# Patient Record
Sex: MALE | Race: BLACK OR AFRICAN AMERICAN | Employment: UNEMPLOYED | URBAN - METROPOLITAN AREA
[De-identification: names, ages, dates, MRNs, and addresses within clinical notes are randomized per-mention and may not be internally consistent; named-entity substitution may affect disease eponyms.]

---

## 2020-01-22 ENCOUNTER — HOSPITAL ENCOUNTER (EMERGENCY)
Age: 50
Discharge: HOME | End: 2020-01-22
Attending: EMERGENCY MEDICINE
Payer: MEDICARE

## 2020-01-22 VITALS
HEART RATE: 69 BPM | BODY MASS INDEX: 28.85 KG/M2 | DIASTOLIC BLOOD PRESSURE: 70 MMHG | WEIGHT: 213 LBS | SYSTOLIC BLOOD PRESSURE: 125 MMHG | HEIGHT: 72 IN | RESPIRATION RATE: 18 BRPM | TEMPERATURE: 98.5 F | OXYGEN SATURATION: 98 %

## 2020-01-22 PROCEDURE — 99282 EMERGENCY DEPT VISIT SF MDM: CPT

## 2020-01-22 PROCEDURE — 69210 REMOVE IMPACTED EAR WAX UNI: CPT

## 2020-01-22 PROCEDURE — 6370000000 HC RX 637 (ALT 250 FOR IP): Performed by: STUDENT IN AN ORGANIZED HEALTH CARE EDUCATION/TRAINING PROGRAM

## 2020-01-22 RX ORDER — MAGNESIUM CARB/ALUMINUM HYDROX 105-160MG
TABLET,CHEWABLE ORAL ONCE
Status: COMPLETED | OUTPATIENT
Start: 2020-01-22 | End: 2020-01-22

## 2020-01-22 RX ADMIN — MINERAL OIL: 1000 SOLUTION ORAL at 16:04

## 2020-01-22 ASSESSMENT — ENCOUNTER SYMPTOMS
NAUSEA: 0
VOMITING: 0
EYE ITCHING: 0
COUGH: 0
SORE THROAT: 0
EYE REDNESS: 0

## 2020-01-22 ASSESSMENT — PAIN DESCRIPTION - PAIN TYPE: TYPE: ACUTE PAIN

## 2020-01-22 ASSESSMENT — PAIN DESCRIPTION - LOCATION: LOCATION: EAR

## 2020-01-22 ASSESSMENT — PAIN DESCRIPTION - ORIENTATION: ORIENTATION: LEFT

## 2020-01-22 ASSESSMENT — PAIN SCALES - GENERAL: PAINLEVEL_OUTOF10: 8

## 2020-01-22 NOTE — ED PROVIDER NOTES
[address] [facility]     Emergency Department     Faculty Attestation    I performed a history and physical examination of the patient and discussed management with the resident. I reviewed the residents note and agree with the documented findings including all diagnostic interpretations and plan of care. Any areas of disagreement are noted on the chart. I was personally present for the key portions of any procedures. I have documented in the chart those procedures where I was not present during the key portions. I have reviewed the emergency nurses triage note. I agree with the chief complaint, past medical history, past surgical history, allergies, medications, social and family history as documented unless otherwise noted below. Documentation of the HPI, Physical Exam and Medical Decision Making performed by [email or web address] is based on my personal performance of the HPI, PE and MDM. For Physician Assistant/ Nurse Practitioner cases/documentation I have personally evaluated this patient and have completed at least one if not all key elements of the E/M (history, physical exam, and MDM). Additional findings are as noted. Primary Care Physician: No primary care provider on file. History: This is a 54 y.o. male who presents to the Emergency Department with complaint of otalgia. Left ear. No drainage no fevers does have decreased hearing. Physical:     height is 6' (1.829 m) and weight is 213 lb (96.6 kg). His oral temperature is 98.5 °F (36.9 °C). His blood pressure is 125/70 and his pulse is 69. His respiration is 18 and oxygen saturation is 98%.     54 y.o. male no acute distress, left ear shows cerumen impaction, no evidence of edema or erythema in the external canal    Impression: Impacted cerumen    Plan: Mineral oil, irrigation       Sherita Sanz MD, [facility]  Attending Emergency Physician         Temo Jean Baptiste MD  01/22/20 4067

## 2020-01-22 NOTE — ED PROVIDER NOTES
[facility] ED  Emergency Department Encounter  Emergency Medicine Resident     Pt Name: Deacon Kebede  MRN: [id number]  [place] 1970  Date ofevaluation: 1/22/20  PCP:  No primary care provider on file. CHIEF COMPLAINT       Chief Complaint   Patient presents with    Otalgia     pt c/o left ear and pressure x 1 month     HISTORY OF PRESENT ILLNESS  (Location/Symptom, Timing/Onset, Context/Setting, Quality, Duration, Modifying Factors, Severity, Associated signs/symptoms)     Deacon Kebede is a 54 y.o. male who presents left-sided hearing loss and pressure for the last month. Patient states that he has had this similar issue before when his ear gets clogged with cerumen. He has been trying to use Maco pins without any success. Also reports some mild ear pain with occasionally at night. No ear discharge, fevers, chills sore throat, rhinorrhea,  unusual headaches, vision changes, weakness numbness or tingling or other concerns. PAST MEDICAL / SURGICAL / SOCIAL / FAMILY HISTORY      has no past medical history on file. has no past surgical history on file.     Social History     Socioeconomic History    Marital status: Single     Spouse name: Not on file    Number of children: Not on file    Years of education: Not on file    Highest education level: Not on file   Occupational History    Not on file   Social Needs    Financial resource strain: Not on file    Food insecurity:     Worry: Not on file     Inability: Not on file    Transportation needs:     Medical: Not on file     Non-medical: Not on file   Tobacco Use    Smoking status: Current Every Day Smoker     Packs/day: 0.50    Smokeless tobacco: Never Used   Substance and Sexual Activity    Alcohol use: Not Currently    Drug use: Yes     Types: Marijuana     Comment: daily     Sexual activity: Not on file   Lifestyle    Physical activity:     Days per week: Not on file     Minutes per session: Not on file    Stress: Not on file   Relationships    Social connections:     Talks on phone: Not on file     Gets together: Not on file     Attends Taoism service: Not on file     Active member of club or organization: Not on file     Attends meetings of clubs or organizations: Not on file     Relationship status: Not on file    Intimate partner violence:     Fear of current or ex partner: Not on file     Emotionally abused: Not on file     Physically abused: Not on file     Forced sexual activity: Not on file   Other Topics Concern    Not on file   Social History Narrative    Not on file       History reviewed. No pertinent family history. Allergies:  Patient has no known allergies. Home Medications:  Prior to Admission medications    Medication Sig Start Date End Date Taking? Authorizing Provider   carbamide peroxide (DEBROX) 6.5 % otic solution Place 5 drops into both ears as needed (for earwax removal) 1/22/20 2/21/20 Yes Alden Alt, DO       REVIEW OF SYSTEMS    (2-9 systems for level 4, 10 or more for level 5)      Review of Systems   Constitutional: Negative for chills and fever. HENT: Positive for ear pain and hearing loss (L side). Negative for ear discharge and sore throat. Eyes: Negative for redness and itching. Respiratory: Negative for cough. Gastrointestinal: Negative for nausea and vomiting. Skin: Negative for rash and wound. Allergic/Immunologic: Negative for environmental allergies and food allergies. Neurological: Negative for dizziness, weakness, light-headedness, numbness and headaches. PHYSICAL EXAM   (up to 7 for level 4, 8 or more for level 5)      INITIAL VITALS:   /70   Pulse 69   Temp 98.5 °F (36.9 °C) (Oral)   Resp 18   Ht 6' (1.829 m)   Wt 213 lb (96.6 kg)   SpO2 98%   BMI 28.89 kg/m²     Physical Exam  Vitals signs and nursing note reviewed. Constitutional:       General: He is not in acute distress. Appearance: Normal appearance.  He is not ill-appearing, toxic-appearing or diaphoretic. HENT:      Head: Normocephalic and atraumatic. Comments: No mastoid tenderness to palpation. Right Ear: Tympanic membrane and ear canal normal.      Left Ear: There is impacted cerumen. Mouth/Throat:      Mouth: Mucous membranes are moist.      Pharynx: Oropharynx is clear. No oropharyngeal exudate or posterior oropharyngeal erythema. Eyes:      Comments: False L eye present. Neck:      Musculoskeletal: Neck supple. Cardiovascular:      Rate and Rhythm: Normal rate and regular rhythm. Heart sounds: No friction rub. No gallop. Pulmonary:      Effort: Pulmonary effort is normal. No respiratory distress. Breath sounds: Normal breath sounds. No stridor. No wheezing, rhonchi or rales. Abdominal:      Palpations: Abdomen is soft. Tenderness: There is no tenderness. Lymphadenopathy:      Cervical: No cervical adenopathy. Skin:     General: Skin is warm and dry. Findings: No rash (over exposed skin). Neurological:      General: No focal deficit present. Mental Status: He is alert and oriented to person, place, and time. Psychiatric:         Mood and Affect: Mood normal.         Behavior: Behavior normal.       DIAGNOSTICS     PLAN (LABS / IMAGING / EKG):  No orders of the defined types were placed in this encounter. MEDICATIONS ORDERED:  Orders Placed This Encounter   Medications    mineral oil liquid    carbamide peroxide (DEBROX) 6.5 % otic solution     Sig: Place 5 drops into both ears as needed (for earwax removal)     Dispense:  1 Bottle     Refill:  0     DIAGNOSTIC RESULTS / EMERGENCYDEPARTMENT COURSE / MDM     LABS:  No results found for this visit on 01/22/20. EMERGENCY DEPARTMENT COURSE:    Patient evaluated by attending physician and myself. Patient presenting with left ear hearing loss. Patient reports he has before and usually has ears cleaned out.   On exam he is nontoxic-appearing no acute distress. His vitals are unremarkable. Heart regular rate and rhythm, lungs are clear to auscultation bilaterally. He does have significant impacted cerumen in the left ear, with only some mild cerumen in the right ear. Tympanic membrane right ear appears clear, cannot visualize TM on left due to significant cerumen impaction. Patient's ear was irrigated several times with mineral oil as well as warm water. Did get significant return, however he continued to have cerumen. Discussed with patient that he will likely need to continue his treatments at home and he is in agreement with this plan. Prescribed Celebrex, and discussed appropriate ear care. Strict return precautions given. Patient instructed to follow with his primary care provider as soon as possible for follow up. Patient and/or family expressed understanding and agreement with this plan. PROCEDURES:  None    CONSULTS:  None    FINAL IMPRESSION      1.  Impacted cerumen of left ear          DISPOSITION / PLAN     DISPOSITION Decision To Discharge 01/22/2020 05:10:25 PM      PATIENT REFERRED TO:  [facility]  [address] [facility] [id number] [phone]  Go to   If symptoms worsen      DISCHARGE MEDICATIONS:  Discharge Medication List as of 1/22/2020  4:44 PM      START taking these medications    Details   carbamide peroxide (DEBROX) 6.5 % otic solution Place 5 drops into both ears as needed (for earwax removal), Disp-1 Bottle, R-0Print             Norman Ellis DO  Emergency Medicine Resident  [facility]    (Please note that portions of this note were completed with a voice recognition program.  Efforts were made to edit thedictations but occasionally words are mis-transcribed.)      Norman Ellis DO  Resident  01/22/20 5625

## 2020-01-22 NOTE — ED NOTES
Pt presents to the ED c/o bilateral ear pain. Pt reports gradually worsening ear pain x 1 month, states that pain is worse to the left ear. Pt states that he has been having gradually worsening pressure in the left ear and decreased hearing. Pt reports similar symptoms in the past when he has had to have his ear cleaned out. Pt denies any other symptoms at this time.   On exam, mild cerumen noted in the right ear canal, left ear canal appears impacted with cerumen, pt afebrile, pt awake and oriented x 4, pt with stable vital signs      Casey Galindo RN  01/22/20 0044

## 2020-09-08 ENCOUNTER — HOSPITAL ENCOUNTER (EMERGENCY)
Age: 50
Discharge: HOME | End: 2020-09-08
Attending: EMERGENCY MEDICINE
Payer: MEDICARE

## 2020-09-08 VITALS
OXYGEN SATURATION: 96 % | WEIGHT: 230 LBS | HEIGHT: 72 IN | DIASTOLIC BLOOD PRESSURE: 72 MMHG | HEART RATE: 99 BPM | TEMPERATURE: 98.7 F | BODY MASS INDEX: 31.15 KG/M2 | RESPIRATION RATE: 16 BRPM | SYSTOLIC BLOOD PRESSURE: 116 MMHG

## 2020-09-08 PROCEDURE — 99283 EMERGENCY DEPT VISIT LOW MDM: CPT

## 2020-09-08 PROCEDURE — 93970 EXTREMITY STUDY: CPT

## 2020-09-08 PROCEDURE — 6370000000 HC RX 637 (ALT 250 FOR IP): Performed by: EMERGENCY MEDICINE

## 2020-09-08 RX ORDER — IBUPROFEN 800 MG/1
800 TABLET ORAL EVERY 8 HOURS PRN
Qty: 30 TABLET | Refills: 0 | Status: SHIPPED | OUTPATIENT
Start: 2020-09-08

## 2020-09-08 RX ORDER — IBUPROFEN 800 MG/1
800 TABLET ORAL ONCE
Status: COMPLETED | OUTPATIENT
Start: 2020-09-08 | End: 2020-09-08

## 2020-09-08 RX ADMIN — IBUPROFEN 800 MG: 800 TABLET ORAL at 19:12

## 2020-09-08 ASSESSMENT — ENCOUNTER SYMPTOMS
SORE THROAT: 0
EYE PAIN: 0
ABDOMINAL PAIN: 0
DIARRHEA: 0
NAUSEA: 0
VOMITING: 0
COUGH: 0
SHORTNESS OF BREATH: 0

## 2020-09-08 ASSESSMENT — PAIN SCALES - GENERAL: PAINLEVEL_OUTOF10: 8

## 2020-09-08 ASSESSMENT — PAIN DESCRIPTION - FREQUENCY: FREQUENCY: CONTINUOUS

## 2020-09-08 ASSESSMENT — PAIN DESCRIPTION - ORIENTATION: ORIENTATION: RIGHT

## 2020-09-08 ASSESSMENT — PAIN DESCRIPTION - LOCATION: LOCATION: LEG

## 2020-09-08 NOTE — ED NOTES
Bed: 45  Expected date:   Expected time:   Means of arrival:   Comments:  No Monitor     Kim Cifuentes RN  09/08/20 9433

## 2020-09-08 NOTE — ED NOTES
Pt arrived to ED with c/o right lower leg pain since Friday;  Pt reports pain increases when stretching or bending leg;  Pt denies swelling; denies; numbness/tingling;  Pt A&OX4; RR even/unlabored; NAD noted;   Pt denies hx of clots; call light within reach; will cont to monitor     Raleigh Mackenzie RN  09/08/20 8478

## 2020-09-08 NOTE — ED PROVIDER NOTES
[facility]  Emergency Department  Faculty Attestation     I performed a history and physical examination of the patient and discussed management with the resident. I reviewed the residents note and agree with the documented findings and plan of care. Any areas of disagreement are noted on the chart. I was personally present for the key portions of any procedures. I have documented in the chart those procedures where I was not present during the key portions. I have reviewed the emergency nurses triage note. I agree with the chief complaint, past medical history, past surgical history, allergies, medications, social and family history as documented unless otherwise noted below. For Physician Assistant/ Nurse Practitioner cases/documentation I have personally evaluated this patient and have completed at least one if not all key elements of the E/M (history, physical exam, and MDM). Additional findings are as noted. Primary Care Physician:  No primary care provider on file. Screenings:  [unfilled]    CHIEF COMPLAINT       Chief Complaint   Patient presents with    Leg Pain     RLE       RECENT VITALS:   Temp: 98.7 °F (37.1 °C),  Pulse: 99, Resp: 16, BP: 116/72    LABS:  Labs Reviewed - No data to display    Radiology  VL DUP LOWER EXTREMITY VENOUS BILATERAL    (Results Pending)       Attending Physician Additional  Notes    Patient has pain and swelling in the right calf. No trauma. No chest pain shortness of breath or hemoptysis. No history of VTE. No family history of VTE. On exam he appears comfortable afebrile vital signs are normal.  Lungs are clear. No gallop. No JVD. There is right calf tenderness with some swelling. Normal pulses. Normal sensation. Normal capillary refill. Impression is calf swelling rule out DVT, no evidence of compartment syndrome. Plan is Doppler, reassess. Everette Sheppard.  Pat Wagoner MD, [address] [facility]  Attending Emergency Physician               Lucía Snowden MD  09/08/20 8363

## 2020-09-09 NOTE — ED PROVIDER NOTES
[facility]  ED  Emergency Department Encounter  EmergencyMedicine Resident     Pt Virginia Fitzpatrick  MRN: [id number]  [place] 1970  Date of evaluation: 9/8/20  PCP:  No primary care provider on file. CHIEF COMPLAINT       Chief Complaint   Patient presents with    Leg Pain     RLE       HISTORY OF PRESENT ILLNESS  (Location/Symptom, Timing/Onset, Context/Setting, Quality, Duration, Modifying Factors, Severity.)      Adam Gay is a 52 y.o. male who presents with complaints of swelling and tenderness in his right lower leg. Patient reports that he has had the symptoms for the past couple days. Mom reports that he looks like his veins are popping out. He has never had really had this in the past.  He has never had a history of DVT or PE. He is not on any blood thinners. He reports no chest pain, shortness of breath, cough. No fevers or chills. No nausea vomiting. Normal ambulation. No known cardiac issues. He is on antihypertensives. Reports no tingling, weakness, numbness. He does have a scrape across the right anterior tibia that he sustained about just over a week ago when he bumped it on a drawer. No drainage from the wound no significant redness or swelling around the wound site. PAST MEDICAL / SURGICAL / SOCIAL / FAMILY HISTORY      has no past medical history on file. has no past surgical history on file.     Social History     Socioeconomic History    Marital status: Single     Spouse name: Not on file    Number of children: Not on file    Years of education: Not on file    Highest education level: Not on file   Occupational History    Not on file   Social Needs    Financial resource strain: Not on file    Food insecurity     Worry: Not on file     Inability: Not on file    Transportation needs     Medical: Not on file     Non-medical: Not on file   Tobacco Use    Smoking status: Current Every Day Smoker     Packs/day: 0.50    Smokeless tobacco: Never Used   Substance and Sexual Activity    Alcohol use: Not Currently    Drug use: Yes     Types: Marijuana     Comment: daily     Sexual activity: Not on file   Lifestyle    Physical activity     Days per week: Not on file     Minutes per session: Not on file    Stress: Not on file   Relationships    Social connections     Talks on phone: Not on file     Gets together: Not on file     Attends Worship service: Not on file     Active member of club or organization: Not on file     Attends meetings of clubs or organizations: Not on file     Relationship status: Not on file    Intimate partner violence     Fear of current or ex partner: Not on file     Emotionally abused: Not on file     Physically abused: Not on file     Forced sexual activity: Not on file   Other Topics Concern    Not on file   Social History Narrative    Not on file       No family history on file. Allergies:  Patient has no known allergies. Home Medications:  Prior to Admission medications    Medication Sig Start Date End Date Taking? Authorizing Provider   ibuprofen (ADVIL;MOTRIN) 800 MG tablet Take 1 tablet by mouth every 8 hours as needed for Pain 9/8/20  Yes Bridget Miller MD   Elastic Bandages & Supports ([address] [facility]) [address] [facility] 1 each by Does not apply route daily 9/8/20  Yes Bridget Miller MD       REVIEW OF SYSTEMS    (2-9 systems for level 4, 10 or more for level 5)      Review of Systems   Constitutional: Negative for activity change, appetite change and fever. HENT: Negative for congestion and sore throat. Eyes: Negative for pain and visual disturbance. Respiratory: Negative for cough and shortness of breath. Cardiovascular: Positive for leg swelling. Negative for chest pain. Gastrointestinal: Negative for abdominal pain, diarrhea, nausea and vomiting. Endocrine: Negative for polyphagia and polyuria. Genitourinary: Negative for dysuria and frequency.    Musculoskeletal: Positive for myalgias. Negative for arthralgias. Skin: Negative for rash and wound. Allergic/Immunologic: Negative for environmental allergies and food allergies. Neurological: Negative for syncope and weakness. Hematological: Negative for adenopathy. Does not bruise/bleed easily. Psychiatric/Behavioral: Negative for confusion. The patient is not nervous/anxious. PHYSICAL EXAM   (up to 7 for level 4, 8 or more for level 5)      INITIAL VITALS:   /72   Pulse 99   Temp 98.7 °F (37.1 °C) (Oral)   Resp 16   Ht 6' (1.829 m)   Wt 230 lb (104.3 kg)   SpO2 96%   BMI 31.19 kg/m²     Physical Exam  Constitutional:       General: He is not in acute distress. Appearance: He is well-developed. HENT:      Head: Normocephalic and atraumatic. Eyes:      Conjunctiva/sclera: Conjunctivae normal.      Pupils: Pupils are equal, round, and reactive to light. Neck:      Musculoskeletal: Normal range of motion and neck supple. Cardiovascular:      Rate and Rhythm: Normal rate and regular rhythm. Heart sounds: Normal heart sounds. No murmur. No friction rub. No gallop. Pulmonary:      Effort: Pulmonary effort is normal. No respiratory distress. Breath sounds: Normal breath sounds. No wheezing, rhonchi or rales. Abdominal:      General: Bowel sounds are normal. There is no distension. Palpations: Abdomen is soft. Tenderness: There is no abdominal tenderness. There is no right CVA tenderness, left CVA tenderness, guarding or rebound. Musculoskeletal: Normal range of motion. Right ankle: He exhibits swelling. Left ankle: He exhibits swelling. Right lower leg: He exhibits tenderness and swelling. He exhibits no bony tenderness. Edema present. Legs:    Skin:     General: Skin is warm and dry. Findings: No rash. Neurological:      Mental Status: He is alert and oriented to person, place, and time. GCS: GCS eye subscore is 4. GCS verbal subscore is 5.  GCS with the patient and mom at the bedside. Both voiced understanding and are in agreement with the plan for anti-inflammatories as well as compression stockings. Patient stable and ready for discharge home. PROCEDURES:  None    CONSULTS:  None    CRITICAL CARE:  None    FINAL IMPRESSION      1. Leg swelling    2. Thrombophlebitis of superficial veins of right lower extremity          DISPOSITION / PLAN     DISPOSITION Decision To Discharge 09/08/2020 08:30:52 PM      PATIENT REFERRED TO:  [facility]  [address]   [place] [address]  [phone]  Call in 2 days  If you need to establish a new PCP and follow-up.       DISCHARGE MEDICATIONS:  Discharge Medication List as of 9/8/2020  8:33 PM      START taking these medications    Details   ibuprofen (ADVIL;MOTRIN) 800 MG tablet Take 1 tablet by mouth every 8 hours as needed for Pain, Disp-30 tablet,R-0Print      Elastic Bandages & Supports ([facility]) [address] [place] [address] Starting Tue 9/8/2020, Disp-5 each,R-0, Print             Alejandro Montalvo MD  Emergency Medicine Resident    (Please note that portions of thisnote were completed with a voice recognition program.  Efforts were made to edit the dictations but occasionally words are mis-transcribed.)       Alejandro Montalvo MD  Resident  09/08/20 0629

## 2022-06-27 ENCOUNTER — HOSPITAL ENCOUNTER (EMERGENCY)
Age: 52
Discharge: HOME | End: 2022-06-27
Attending: EMERGENCY MEDICINE
Payer: MEDICARE

## 2022-06-27 VITALS
HEART RATE: 73 BPM | SYSTOLIC BLOOD PRESSURE: 105 MMHG | BODY MASS INDEX: 31.83 KG/M2 | WEIGHT: 235 LBS | TEMPERATURE: 97.7 F | HEIGHT: 72 IN | OXYGEN SATURATION: 96 % | DIASTOLIC BLOOD PRESSURE: 69 MMHG | RESPIRATION RATE: 18 BRPM

## 2022-06-27 DIAGNOSIS — H61.22 IMPACTED CERUMEN OF LEFT EAR: Primary | ICD-10-CM

## 2022-06-27 PROCEDURE — 6370000000 HC RX 637 (ALT 250 FOR IP): Performed by: STUDENT IN AN ORGANIZED HEALTH CARE EDUCATION/TRAINING PROGRAM

## 2022-06-27 PROCEDURE — 99283 EMERGENCY DEPT VISIT LOW MDM: CPT

## 2022-06-27 RX ADMIN — DOCUSATE SODIUM LIQUID 10 MG: 100 LIQUID ORAL at 02:28

## 2022-06-27 NOTE — ED NOTES
Dr. Dixon Stiles at bedside to irrigate pts left ear with RN at bedside for assistance. Large amount of hardened wax irrigated from left ear. Pt tolerates well. Pt reports he can hear from much better from left ear. Pt denies pain to left ear.      Artist RYAN Lennon  06/27/22 2873
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
## 2022-06-30 ASSESSMENT — ENCOUNTER SYMPTOMS
SHORTNESS OF BREATH: 0
NAUSEA: 0
RHINORRHEA: 0
SORE THROAT: 0
COUGH: 0
BACK PAIN: 0
ABDOMINAL PAIN: 0
VOMITING: 0
DIARRHEA: 0

## 2023-09-22 ENCOUNTER — HOSPITAL ENCOUNTER (EMERGENCY)
Age: 53
Discharge: HOME | End: 2023-09-22
Attending: EMERGENCY MEDICINE
Payer: MEDICAID

## 2023-09-22 VITALS
HEIGHT: 71 IN | TEMPERATURE: 98.1 F | WEIGHT: 235 LBS | SYSTOLIC BLOOD PRESSURE: 101 MMHG | RESPIRATION RATE: 20 BRPM | HEART RATE: 70 BPM | BODY MASS INDEX: 32.9 KG/M2 | OXYGEN SATURATION: 95 % | DIASTOLIC BLOOD PRESSURE: 71 MMHG

## 2023-09-22 DIAGNOSIS — H10.9 CONJUNCTIVITIS OF RIGHT EYE, UNSPECIFIED CONJUNCTIVITIS TYPE: Primary | ICD-10-CM

## 2023-09-22 PROCEDURE — 6370000000 HC RX 637 (ALT 250 FOR IP): Performed by: HEALTH CARE PROVIDER

## 2023-09-22 PROCEDURE — 99283 EMERGENCY DEPT VISIT LOW MDM: CPT | Performed by: EMERGENCY MEDICINE

## 2023-09-22 RX ORDER — ERYTHROMYCIN 5 MG/G
OINTMENT OPHTHALMIC
Qty: 3.5 G | Refills: 0 | Status: SHIPPED | OUTPATIENT
Start: 2023-09-22 | End: 2023-10-02

## 2023-09-22 RX ORDER — OLOPATADINE HYDROCHLORIDE 1 MG/ML
1 SOLUTION/ DROPS OPHTHALMIC 2 TIMES DAILY
Qty: 5 ML | Refills: 0 | Status: SHIPPED | OUTPATIENT
Start: 2023-09-22 | End: 2023-10-22

## 2023-09-22 RX ORDER — TETRACAINE HYDROCHLORIDE 5 MG/ML
1 SOLUTION OPHTHALMIC ONCE
Status: COMPLETED | OUTPATIENT
Start: 2023-09-22 | End: 2023-09-22

## 2023-09-22 RX ADMIN — FLUORESCEIN SODIUM 1 MG: 1 STRIP OPHTHALMIC at 01:04

## 2023-09-22 RX ADMIN — TETRACAINE HYDROCHLORIDE 1 DROP: 5 SOLUTION OPHTHALMIC at 01:04

## 2023-09-22 ASSESSMENT — ENCOUNTER SYMPTOMS
EYE REDNESS: 1
EYE DISCHARGE: 1
SHORTNESS OF BREATH: 0
EYE PAIN: 0
NAUSEA: 0
CONSTIPATION: 0
ABDOMINAL PAIN: 0
EYE ITCHING: 1
VOMITING: 0
PHOTOPHOBIA: 0
SORE THROAT: 0
DIARRHEA: 0

## 2023-09-22 ASSESSMENT — PAIN - FUNCTIONAL ASSESSMENT: PAIN_FUNCTIONAL_ASSESSMENT: 0-10

## 2023-09-22 ASSESSMENT — VISUAL ACUITY
OD: 20/25
OU: 1

## 2023-09-22 ASSESSMENT — PAIN DESCRIPTION - LOCATION: LOCATION: EYE

## 2023-09-22 ASSESSMENT — PAIN SCALES - GENERAL: PAINLEVEL_OUTOF10: 9
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
## 2023-09-22 NOTE — ED TRIAGE NOTES
Pt ambulated to room 05 from triage with c/o right eye pain, redness and drainage since this am. Pt states he woke up with these symptoms and it has gotten worse throughout the day. Pt denies injury. No other complaints at this time. Call light is within reach with family in room.

## 2023-09-22 NOTE — DISCHARGE INSTRUCTIONS
Call today or tomorrow to follow up with Salud Rodriguez MD  in 2-3 days. If you are given an antibiotic then make sure you get the prescription filled and take the antibiotics by placing 2 drops into the affected eye every 4 hours for the next 5-7 days. Francis Martinez has some antibiotics for free; [facility] Lewis has a 4 dollar prescription plan for some antibiotics. Make sure you wash your hands multiple times a day (especially if you touch your eye), do not rub your eye. Taking Benadryl can help with the itching. Return to the Emergency Department for worsening of swelling to eye, drainage from eye, the white of your eye turns red, inability to see, any other care or concern.